# Patient Record
Sex: MALE | URBAN - METROPOLITAN AREA
[De-identification: names, ages, dates, MRNs, and addresses within clinical notes are randomized per-mention and may not be internally consistent; named-entity substitution may affect disease eponyms.]

---

## 2024-09-02 ENCOUNTER — HOSPITAL ENCOUNTER (OUTPATIENT)
Facility: MEDICAL CENTER | Age: 39
End: 2024-09-03
Attending: STUDENT IN AN ORGANIZED HEALTH CARE EDUCATION/TRAINING PROGRAM | Admitting: STUDENT IN AN ORGANIZED HEALTH CARE EDUCATION/TRAINING PROGRAM

## 2024-09-02 ENCOUNTER — HOSPITAL ENCOUNTER (OUTPATIENT)
Dept: RADIOLOGY | Facility: MEDICAL CENTER | Age: 39
End: 2024-09-02

## 2024-09-02 DIAGNOSIS — T50.901A ACCIDENTAL DRUG OVERDOSE, INITIAL ENCOUNTER: ICD-10-CM

## 2024-09-02 DIAGNOSIS — J96.01 ACUTE RESPIRATORY FAILURE WITH HYPOXIA (HCC): ICD-10-CM

## 2024-09-02 PROBLEM — F19.10 POLYSUBSTANCE ABUSE (HCC): Status: ACTIVE | Noted: 2024-09-02

## 2024-09-02 PROBLEM — R74.01 TRANSAMINITIS: Status: ACTIVE | Noted: 2024-09-02

## 2024-09-02 PROBLEM — R09.02 HYPOXEMIA: Status: ACTIVE | Noted: 2024-09-02

## 2024-09-02 LAB
ALBUMIN SERPL BCP-MCNC: 4.1 G/DL (ref 3.2–4.9)
ALBUMIN/GLOB SERPL: 1.4 G/DL
ALP SERPL-CCNC: 121 U/L (ref 30–99)
ALT SERPL-CCNC: 78 U/L (ref 2–50)
ANION GAP SERPL CALC-SCNC: 16 MMOL/L (ref 7–16)
AST SERPL-CCNC: 153 U/L (ref 12–45)
BASOPHILS # BLD AUTO: 0.3 % (ref 0–1.8)
BASOPHILS # BLD: 0.05 K/UL (ref 0–0.12)
BILIRUB SERPL-MCNC: 0.2 MG/DL (ref 0.1–1.5)
BUN SERPL-MCNC: 16 MG/DL (ref 8–22)
CALCIUM ALBUM COR SERPL-MCNC: 8.2 MG/DL (ref 8.5–10.5)
CALCIUM SERPL-MCNC: 8.3 MG/DL (ref 8.5–10.5)
CHLORIDE SERPL-SCNC: 103 MMOL/L (ref 96–112)
CO2 SERPL-SCNC: 21 MMOL/L (ref 20–33)
CREAT SERPL-MCNC: 0.83 MG/DL (ref 0.5–1.4)
EOSINOPHIL # BLD AUTO: 0.01 K/UL (ref 0–0.51)
EOSINOPHIL NFR BLD: 0.1 % (ref 0–6.9)
ERYTHROCYTE [DISTWIDTH] IN BLOOD BY AUTOMATED COUNT: 41.8 FL (ref 35.9–50)
GFR SERPLBLD CREATININE-BSD FMLA CKD-EPI: 115 ML/MIN/1.73 M 2
GLOBULIN SER CALC-MCNC: 2.9 G/DL (ref 1.9–3.5)
GLUCOSE SERPL-MCNC: 106 MG/DL (ref 65–99)
HCT VFR BLD AUTO: 49.5 % (ref 42–52)
HGB BLD-MCNC: 16.8 G/DL (ref 14–18)
IMM GRANULOCYTES # BLD AUTO: 0.12 K/UL (ref 0–0.11)
IMM GRANULOCYTES NFR BLD AUTO: 0.7 % (ref 0–0.9)
LYMPHOCYTES # BLD AUTO: 0.58 K/UL (ref 1–4.8)
LYMPHOCYTES NFR BLD: 3.4 % (ref 22–41)
MCH RBC QN AUTO: 30.3 PG (ref 27–33)
MCHC RBC AUTO-ENTMCNC: 33.9 G/DL (ref 32.3–36.5)
MCV RBC AUTO: 89.4 FL (ref 81.4–97.8)
MONOCYTES # BLD AUTO: 1.12 K/UL (ref 0–0.85)
MONOCYTES NFR BLD AUTO: 6.5 % (ref 0–13.4)
NEUTROPHILS # BLD AUTO: 15.41 K/UL (ref 1.82–7.42)
NEUTROPHILS NFR BLD: 89 % (ref 44–72)
NRBC # BLD AUTO: 0 K/UL
NRBC BLD-RTO: 0 /100 WBC (ref 0–0.2)
PLATELET # BLD AUTO: 276 K/UL (ref 164–446)
PMV BLD AUTO: 9 FL (ref 9–12.9)
POTASSIUM SERPL-SCNC: 4.2 MMOL/L (ref 3.6–5.5)
PROT SERPL-MCNC: 7 G/DL (ref 6–8.2)
RBC # BLD AUTO: 5.54 M/UL (ref 4.7–6.1)
SODIUM SERPL-SCNC: 140 MMOL/L (ref 135–145)
TROPONIN T SERPL-MCNC: 30 NG/L (ref 6–19)
TROPONIN T SERPL-MCNC: 34 NG/L (ref 6–19)
WBC # BLD AUTO: 17.3 K/UL (ref 4.8–10.8)

## 2024-09-02 PROCEDURE — 85025 COMPLETE CBC W/AUTO DIFF WBC: CPT

## 2024-09-02 PROCEDURE — 99418 PROLNG IP/OBS E/M EA 15 MIN: CPT | Performed by: STUDENT IN AN ORGANIZED HEALTH CARE EDUCATION/TRAINING PROGRAM

## 2024-09-02 PROCEDURE — 99223 1ST HOSP IP/OBS HIGH 75: CPT | Performed by: STUDENT IN AN ORGANIZED HEALTH CARE EDUCATION/TRAINING PROGRAM

## 2024-09-02 PROCEDURE — 700111 HCHG RX REV CODE 636 W/ 250 OVERRIDE (IP): Mod: JZ | Performed by: STUDENT IN AN ORGANIZED HEALTH CARE EDUCATION/TRAINING PROGRAM

## 2024-09-02 PROCEDURE — 99285 EMERGENCY DEPT VISIT HI MDM: CPT

## 2024-09-02 PROCEDURE — G0378 HOSPITAL OBSERVATION PER HR: HCPCS

## 2024-09-02 PROCEDURE — 96365 THER/PROPH/DIAG IV INF INIT: CPT

## 2024-09-02 PROCEDURE — 700105 HCHG RX REV CODE 258: Performed by: STUDENT IN AN ORGANIZED HEALTH CARE EDUCATION/TRAINING PROGRAM

## 2024-09-02 PROCEDURE — 36415 COLL VENOUS BLD VENIPUNCTURE: CPT

## 2024-09-02 PROCEDURE — 84484 ASSAY OF TROPONIN QUANT: CPT

## 2024-09-02 PROCEDURE — 80053 COMPREHEN METABOLIC PANEL: CPT

## 2024-09-02 RX ORDER — SODIUM CHLORIDE, SODIUM LACTATE, POTASSIUM CHLORIDE, CALCIUM CHLORIDE 600; 310; 30; 20 MG/100ML; MG/100ML; MG/100ML; MG/100ML
INJECTION, SOLUTION INTRAVENOUS CONTINUOUS
Status: DISCONTINUED | OUTPATIENT
Start: 2024-09-02 | End: 2024-09-03 | Stop reason: HOSPADM

## 2024-09-02 RX ORDER — ONDANSETRON 4 MG/1
4 TABLET, ORALLY DISINTEGRATING ORAL EVERY 4 HOURS PRN
Status: DISCONTINUED | OUTPATIENT
Start: 2024-09-02 | End: 2024-09-03 | Stop reason: HOSPADM

## 2024-09-02 RX ORDER — KETOROLAC TROMETHAMINE 15 MG/ML
15 INJECTION, SOLUTION INTRAMUSCULAR; INTRAVENOUS EVERY 6 HOURS PRN
Status: DISCONTINUED | OUTPATIENT
Start: 2024-09-02 | End: 2024-09-03 | Stop reason: HOSPADM

## 2024-09-02 RX ORDER — PROCHLORPERAZINE EDISYLATE 5 MG/ML
5-10 INJECTION INTRAMUSCULAR; INTRAVENOUS EVERY 4 HOURS PRN
Status: DISCONTINUED | OUTPATIENT
Start: 2024-09-02 | End: 2024-09-03 | Stop reason: HOSPADM

## 2024-09-02 RX ORDER — PROMETHAZINE HYDROCHLORIDE 25 MG/1
12.5-25 TABLET ORAL EVERY 4 HOURS PRN
Status: DISCONTINUED | OUTPATIENT
Start: 2024-09-02 | End: 2024-09-03 | Stop reason: HOSPADM

## 2024-09-02 RX ORDER — PROMETHAZINE HYDROCHLORIDE 25 MG/1
12.5-25 SUPPOSITORY RECTAL EVERY 4 HOURS PRN
Status: DISCONTINUED | OUTPATIENT
Start: 2024-09-02 | End: 2024-09-03 | Stop reason: HOSPADM

## 2024-09-02 RX ORDER — ENOXAPARIN SODIUM 100 MG/ML
40 INJECTION SUBCUTANEOUS DAILY
Status: DISCONTINUED | OUTPATIENT
Start: 2024-09-03 | End: 2024-09-03 | Stop reason: HOSPADM

## 2024-09-02 RX ORDER — ONDANSETRON 2 MG/ML
4 INJECTION INTRAMUSCULAR; INTRAVENOUS EVERY 4 HOURS PRN
Status: DISCONTINUED | OUTPATIENT
Start: 2024-09-02 | End: 2024-09-03 | Stop reason: HOSPADM

## 2024-09-02 RX ADMIN — SODIUM CHLORIDE, POTASSIUM CHLORIDE, SODIUM LACTATE AND CALCIUM CHLORIDE: 600; 310; 30; 20 INJECTION, SOLUTION INTRAVENOUS at 21:53

## 2024-09-02 RX ADMIN — AMPICILLIN AND SULBACTAM 3 G: 1; 2 INJECTION, POWDER, FOR SOLUTION INTRAMUSCULAR; INTRAVENOUS at 20:43

## 2024-09-02 SDOH — ECONOMIC STABILITY: TRANSPORTATION INSECURITY
IN THE PAST 12 MONTHS, HAS THE LACK OF TRANSPORTATION KEPT YOU FROM MEDICAL APPOINTMENTS OR FROM GETTING MEDICATIONS?: NO

## 2024-09-02 SDOH — ECONOMIC STABILITY: TRANSPORTATION INSECURITY
IN THE PAST 12 MONTHS, HAS LACK OF RELIABLE TRANSPORTATION KEPT YOU FROM MEDICAL APPOINTMENTS, MEETINGS, WORK OR FROM GETTING THINGS NEEDED FOR DAILY LIVING?: NO

## 2024-09-02 ASSESSMENT — LIFESTYLE VARIABLES
EVER HAD A DRINK FIRST THING IN THE MORNING TO STEADY YOUR NERVES TO GET RID OF A HANGOVER: NO
AVERAGE NUMBER OF DAYS PER WEEK YOU HAVE A DRINK CONTAINING ALCOHOL: 2
TOTAL SCORE: 1
TOTAL SCORE: 1
HOW MANY TIMES IN THE PAST YEAR HAVE YOU HAD 5 OR MORE DRINKS IN A DAY: 1
HAVE YOU EVER FELT YOU SHOULD CUT DOWN ON YOUR DRINKING: YES
DOES PATIENT WANT TO STOP DRINKING: NO
TOTAL SCORE: 1
CONSUMPTION TOTAL: POSITIVE
ALCOHOL_USE: YES
HAVE PEOPLE ANNOYED YOU BY CRITICIZING YOUR DRINKING: NO
ON A TYPICAL DAY WHEN YOU DRINK ALCOHOL HOW MANY DRINKS DO YOU HAVE: 2
EVER FELT BAD OR GUILTY ABOUT YOUR DRINKING: NO

## 2024-09-02 ASSESSMENT — SOCIAL DETERMINANTS OF HEALTH (SDOH)
WITHIN THE PAST 12 MONTHS, YOU WORRIED THAT YOUR FOOD WOULD RUN OUT BEFORE YOU GOT THE MONEY TO BUY MORE: NEVER TRUE
WITHIN THE LAST YEAR, HAVE YOU BEEN HUMILIATED OR EMOTIONALLY ABUSED IN OTHER WAYS BY YOUR PARTNER OR EX-PARTNER?: PATIENT DECLINED
WITHIN THE LAST YEAR, HAVE TO BEEN RAPED OR FORCED TO HAVE ANY KIND OF SEXUAL ACTIVITY BY YOUR PARTNER OR EX-PARTNER?: PATIENT DECLINED
WITHIN THE PAST 12 MONTHS, THE FOOD YOU BOUGHT JUST DIDN'T LAST AND YOU DIDN'T HAVE MONEY TO GET MORE: NEVER TRUE
IN THE PAST 12 MONTHS, HAS THE ELECTRIC, GAS, OIL, OR WATER COMPANY THREATENED TO SHUT OFF SERVICE IN YOUR HOME?: NO
WITHIN THE LAST YEAR, HAVE YOU BEEN AFRAID OF YOUR PARTNER OR EX-PARTNER?: PATIENT DECLINED
WITHIN THE LAST YEAR, HAVE YOU BEEN KICKED, HIT, SLAPPED, OR OTHERWISE PHYSICALLY HURT BY YOUR PARTNER OR EX-PARTNER?: PATIENT DECLINED

## 2024-09-02 ASSESSMENT — PATIENT HEALTH QUESTIONNAIRE - PHQ9
SUM OF ALL RESPONSES TO PHQ9 QUESTIONS 1 AND 2: 0
1. LITTLE INTEREST OR PLEASURE IN DOING THINGS: NOT AT ALL
2. FEELING DOWN, DEPRESSED, IRRITABLE, OR HOPELESS: NOT AT ALL

## 2024-09-02 ASSESSMENT — FIBROSIS 4 INDEX: FIB4 SCORE: 2.32

## 2024-09-02 ASSESSMENT — PAIN DESCRIPTION - PAIN TYPE: TYPE: ACUTE PAIN

## 2024-09-03 ENCOUNTER — APPOINTMENT (OUTPATIENT)
Dept: RADIOLOGY | Facility: MEDICAL CENTER | Age: 39
End: 2024-09-03
Attending: STUDENT IN AN ORGANIZED HEALTH CARE EDUCATION/TRAINING PROGRAM

## 2024-09-03 ENCOUNTER — PHARMACY VISIT (OUTPATIENT)
Dept: PHARMACY | Facility: MEDICAL CENTER | Age: 39
End: 2024-09-03
Payer: COMMERCIAL

## 2024-09-03 VITALS
HEART RATE: 76 BPM | TEMPERATURE: 98 F | RESPIRATION RATE: 18 BRPM | BODY MASS INDEX: 28.64 KG/M2 | OXYGEN SATURATION: 91 % | HEIGHT: 69 IN | DIASTOLIC BLOOD PRESSURE: 55 MMHG | WEIGHT: 193.34 LBS | SYSTOLIC BLOOD PRESSURE: 99 MMHG

## 2024-09-03 LAB
ALBUMIN SERPL BCP-MCNC: 3.7 G/DL (ref 3.2–4.9)
ALBUMIN/GLOB SERPL: 1.4 G/DL
ALP SERPL-CCNC: 101 U/L (ref 30–99)
ALT SERPL-CCNC: 62 U/L (ref 2–50)
ANION GAP SERPL CALC-SCNC: 11 MMOL/L (ref 7–16)
AST SERPL-CCNC: 66 U/L (ref 12–45)
BASOPHILS # BLD AUTO: 0.3 % (ref 0–1.8)
BASOPHILS # BLD: 0.02 K/UL (ref 0–0.12)
BILIRUB SERPL-MCNC: 0.4 MG/DL (ref 0.1–1.5)
BUN SERPL-MCNC: 15 MG/DL (ref 8–22)
CALCIUM ALBUM COR SERPL-MCNC: 8.9 MG/DL (ref 8.5–10.5)
CALCIUM SERPL-MCNC: 8.7 MG/DL (ref 8.5–10.5)
CHLORIDE SERPL-SCNC: 103 MMOL/L (ref 96–112)
CO2 SERPL-SCNC: 25 MMOL/L (ref 20–33)
CREAT SERPL-MCNC: 0.72 MG/DL (ref 0.5–1.4)
EOSINOPHIL # BLD AUTO: 0.01 K/UL (ref 0–0.51)
EOSINOPHIL NFR BLD: 0.1 % (ref 0–6.9)
ERYTHROCYTE [DISTWIDTH] IN BLOOD BY AUTOMATED COUNT: 42.5 FL (ref 35.9–50)
GFR SERPLBLD CREATININE-BSD FMLA CKD-EPI: 120 ML/MIN/1.73 M 2
GLOBULIN SER CALC-MCNC: 2.7 G/DL (ref 1.9–3.5)
GLUCOSE SERPL-MCNC: 117 MG/DL (ref 65–99)
HCT VFR BLD AUTO: 45.9 % (ref 42–52)
HGB BLD-MCNC: 15.3 G/DL (ref 14–18)
IMM GRANULOCYTES # BLD AUTO: 0.03 K/UL (ref 0–0.11)
IMM GRANULOCYTES NFR BLD AUTO: 0.4 % (ref 0–0.9)
LYMPHOCYTES # BLD AUTO: 1.26 K/UL (ref 1–4.8)
LYMPHOCYTES NFR BLD: 16.3 % (ref 22–41)
MCH RBC QN AUTO: 29.8 PG (ref 27–33)
MCHC RBC AUTO-ENTMCNC: 33.3 G/DL (ref 32.3–36.5)
MCV RBC AUTO: 89.5 FL (ref 81.4–97.8)
MONOCYTES # BLD AUTO: 0.53 K/UL (ref 0–0.85)
MONOCYTES NFR BLD AUTO: 6.8 % (ref 0–13.4)
NEUTROPHILS # BLD AUTO: 5.9 K/UL (ref 1.82–7.42)
NEUTROPHILS NFR BLD: 76.1 % (ref 44–72)
NRBC # BLD AUTO: 0 K/UL
NRBC BLD-RTO: 0 /100 WBC (ref 0–0.2)
PLATELET # BLD AUTO: 238 K/UL (ref 164–446)
PMV BLD AUTO: 9.1 FL (ref 9–12.9)
POTASSIUM SERPL-SCNC: 4 MMOL/L (ref 3.6–5.5)
PROT SERPL-MCNC: 6.4 G/DL (ref 6–8.2)
RBC # BLD AUTO: 5.13 M/UL (ref 4.7–6.1)
SODIUM SERPL-SCNC: 139 MMOL/L (ref 135–145)
TROPONIN T SERPL-MCNC: 30 NG/L (ref 6–19)
WBC # BLD AUTO: 7.8 K/UL (ref 4.8–10.8)

## 2024-09-03 PROCEDURE — 99239 HOSP IP/OBS DSCHRG MGMT >30: CPT | Performed by: HOSPITALIST

## 2024-09-03 PROCEDURE — G0378 HOSPITAL OBSERVATION PER HR: HCPCS

## 2024-09-03 PROCEDURE — 80053 COMPREHEN METABOLIC PANEL: CPT

## 2024-09-03 PROCEDURE — 76705 ECHO EXAM OF ABDOMEN: CPT

## 2024-09-03 PROCEDURE — RXMED WILLOW AMBULATORY MEDICATION CHARGE: Performed by: HOSPITALIST

## 2024-09-03 PROCEDURE — 700111 HCHG RX REV CODE 636 W/ 250 OVERRIDE (IP): Mod: JZ | Performed by: STUDENT IN AN ORGANIZED HEALTH CARE EDUCATION/TRAINING PROGRAM

## 2024-09-03 PROCEDURE — 85025 COMPLETE CBC W/AUTO DIFF WBC: CPT

## 2024-09-03 PROCEDURE — 84484 ASSAY OF TROPONIN QUANT: CPT

## 2024-09-03 PROCEDURE — 700105 HCHG RX REV CODE 258: Performed by: STUDENT IN AN ORGANIZED HEALTH CARE EDUCATION/TRAINING PROGRAM

## 2024-09-03 RX ADMIN — AMPICILLIN AND SULBACTAM 3 G: 1; 2 INJECTION, POWDER, FOR SOLUTION INTRAMUSCULAR; INTRAVENOUS at 05:41

## 2024-09-03 RX ADMIN — AMPICILLIN AND SULBACTAM 3 G: 1; 2 INJECTION, POWDER, FOR SOLUTION INTRAMUSCULAR; INTRAVENOUS at 01:08

## 2024-09-03 NOTE — ED TRIAGE NOTES
"Chief Complaint   Patient presents with    Drug Overdose     Pt transferred from [facility] for possible drug overdose pt was found unresponsive and cyanotic at camp site given 12 narcan after 4 rounds of CPR via AED pt became responsive.        [facility] via [place] for above complaint Pt was found unresponsive in tent by friends BLS unit called and performed 4 rounds of CPR with AED but no shockable rhythm, no ACLS drugs administered. pt was given 12 doses of Narcan and became responsive PTA to hospital. Only interventions done in ED was zofran given for one episode of vomiting. Pt is Citizen of [place] speaking only.      Labs drawn, Cardiac, spO2 and BP monitor applied.       /72   Pulse 79   Temp 36.5 °C (97.7 °F) (Temporal)   Resp 18   Ht 1.75 m (5' 8.9\")   Wt 83.9 kg (185 lb)   SpO2 91%   BMI 27.40 kg/m²     "

## 2024-09-03 NOTE — PROGRESS NOTES
Bedside shift report received from night shift RN. Patient A&Ox4, in bed resting comfortably. Slov[place] speaking only. Bed in lowest, locked position with call light and belongings within reach. Fall precautions reviewed with patient. Patient updated on POC.

## 2024-09-03 NOTE — H&P
Hospital Medicine History & Physical Note    Date of Service  9/2/2024    Primary Care Physician  No primary care provider on file.    Consultants  None    Code Status  Full Code    Chief Complaint  Chief Complaint   Patient presents with    Drug Overdose     Pt transferred from [facility] for possible drug overdose pt was found unresponsive and cyanotic at [place] site given 12 narcan after 4 rounds of CPR via AED pt became responsive.        History of Presenting Illness  Josh Cramer is a 39 y.o. male who presented 9/2/2024 with altered mental status.  Patient is a poor historian.  He was found unresponsive in his car.  EMS was called, patient was given 4 rounds of CPR and 12 Narcan with some improvement in mental status  and was then taken to the ER for further evaluation.    At outside hospital:  Troponin 87 (N: <75)  Glucose 167 BUN 16 creatinine 1.39 bicarbonate 20 chloride 102 sodium 141 potassium 4.5 anion gap 23 alkaline phosphatase 144    EKG shows normal sinus rhythm with no ischemic changes  Hemoglobin 17.2  Tylenol level 0 salicylate level 7.5 ethanol level 14 (N: <10)  U tox positive for cocaine  CT head showing no acute pathology    Patient was then transferred to [facility] for further care.  Upon my assessment with him, patient has no recollection of what happened today.  He denies any illicit drug use and states that he is a social drinker but denies any recent alcohol use in the last few days.  Patient admitted for hypoxemia.      I discussed the plan of care with patient.    Review of Systems  ROS    Past Medical History   has no past medical history on file.    Surgical History   has no past surgical history on file.     Family History  family history is not on file.   Family history reviewed with patient. There is no family history that is pertinent to the chief complaint.     Social History       Allergies  No Known Allergies    Medications  None       Physical Exam  Temp:  [36.5 °C  "(97.7 °F)] 36.5 °C (97.7 °F)  Pulse:  [78-79] 78  Resp:  [18-20] 20  BP: (115)/(72) 115/72  SpO2:  [91 %-96 %] 96 %  Blood Pressure: 115/72   Temperature: 36.5 °C (97.7 °F)   Pulse: 78   Respiration: 20   Pulse Oximetry: 96 %       Physical Exam  Constitutional:       Appearance: Normal appearance.      Comments: Lethargic, poor historian   HENT:      Head: Normocephalic.      Nose: Nose normal.      Mouth/Throat:      Mouth: Mucous membranes are moist.   Eyes:      Pupils: Pupils are equal, round, and reactive to light.   Cardiovascular:      Rate and Rhythm: Normal rate and regular rhythm.      Pulses: Normal pulses.   Pulmonary:      Effort: Pulmonary effort is normal.      Breath sounds: Normal breath sounds.   Abdominal:      General: Abdomen is flat. Bowel sounds are normal.      Palpations: Abdomen is soft.   Musculoskeletal:         General: Normal range of motion.      Cervical back: Neck supple.   Skin:     General: Skin is warm.   Neurological:      General: No focal deficit present.      Mental Status: He is oriented to person, place, and time. Mental status is at baseline.   Psychiatric:         Mood and Affect: Mood normal.         Behavior: Behavior normal.         Thought Content: Thought content normal.         Judgment: Judgment normal.         Laboratory:  Recent Labs     09/02/24 1847   WBC 17.3*   RBC 5.54   HEMOGLOBIN 16.8   HEMATOCRIT 49.5   MCV 89.4   MCH 30.3   MCHC 33.9   RDW 41.8   PLATELETCT 276   MPV 9.0     Recent Labs     09/02/24  1847   SODIUM 140   POTASSIUM 4.2   CHLORIDE 103   CO2 21   GLUCOSE 106*   BUN 16   CREATININE 0.83   CALCIUM 8.3*     Recent Labs     09/02/24  1847   ALTSGPT 78*   ASTSGOT 153*   ALKPHOSPHAT 121*   TBILIRUBIN 0.2   GLUCOSE 106*         No results for input(s): \"NTPROBNP\" in the last 72 hours.      Recent Labs     09/02/24  1847   TROPONINT 30*       Imaging:  OUTSIDE IMAGES-DX CHEST   Final Result          X-Ray:  I have personally reviewed the images and " compared with prior images.    Assessment/Plan:  Justification for Admission Status  I anticipate this patient is appropriate for observation status at this time because patient has suspected aspiration pneumonia    Patient will need a Med/Surg bed on MEDICAL service .  The need is secondary to hypoxemia.    * Hypoxemia- (present on admission)  Assessment & Plan  Unclear cause of hypoxemia at the moment, aspiration pneumonia possibility  Can start Unasyn for now as patient also has leukocytosis  U-Tox positive for cocaine, patient has been given multiple rounds of Narcan    Polysubstance abuse (HCC)  Assessment & Plan  U tox positive for cocaine  Avoid beta-blockers    Transaminitis  Assessment & Plan  Patient reports only being a social drinker and denies any recent alcohol use  Found to have transaminitis with mildly elevated ethanol level  Transaminitis pattern suggestive of alcoholic liver disease  Right precordial ultrasound        VTE prophylaxis: enoxaparin ppx    Total time spent on visit 90 minutes reviewing records from outside facility, discussing treatment plan, diagnosis, risk and benefit, prognosis

## 2024-09-03 NOTE — PROGRESS NOTES
Received pt on 3L per nc from noc shift. Titrated down to RA, see flow sheets for details. Pt 86% on RA, did not rise with deep breathing and IS use. Placed back on 1L per nc with O2 >90% at this time.

## 2024-09-03 NOTE — ED PROVIDER NOTES
"ED Provider Note    CHIEF COMPLAINT  Chief Complaint   Patient presents with    Drug Overdose     Pt transferred from [facility] for possible drug overdose pt was found unresponsive and cyanotic at camp site given 12 narcan after 4 rounds of CPR via AED pt became responsive.        EXTERNAL RECORDS REVIEWED  External ED Note From outside hospital today, negtive CT head, CXR, blood work with elevated troponin.    HPI/ROS  LIMITATION TO HISTORY   Select: Language [place],  Used   OUTSIDE HISTORIAN(S):      Josh Carmer is a 39 y.o. male who presents after cardiac arrest. Patient was found unresponsive by friends. First responders found patient in cardiac arrest and performed 4 rounds of CPR, no AED shock. Narcan given shortly before paramedic arrival. Patient became alert but agitated shortly after. Patient has no specific complaints.    PAST MEDICAL HISTORY       SURGICAL HISTORY  patient denies any surgical history    FAMILY HISTORY  No family history on file.    SOCIAL HISTORY  Social History     Tobacco Use    Smoking status: Never    Smokeless tobacco: Never   Vaping Use    Vaping status: Never Used   Substance and Sexual Activity    Alcohol use: Not on file    Drug use: Not on file    Sexual activity: Not on file       CURRENT MEDICATIONS  Home Medications       Reviewed by Nadia Blake R.N. (Registered Nurse) on 09/02/24 at 2317  Med List Status: Complete     Medication Last Dose Status        Patient Shan Taking any Medications                           ALLERGIES  No Known Allergies    PHYSICAL EXAM  VITAL SIGNS: /59   Pulse 75   Temp 36.5 °C (97.7 °F) (Temporal)   Resp 18   Ht 1.75 m (5' 8.9\")   Wt 87.7 kg (193 lb 5.5 oz)   SpO2 95%   BMI 28.64 kg/m²    Physical Exam  Vitals and nursing note reviewed. Exam conducted with a chaperone present.   HENT:      Head: Normocephalic and atraumatic.      Right Ear: External ear normal.      Left Ear: External ear normal.      " Mouth/Throat:      Mouth: Mucous membranes are moist.   Eyes:      Extraocular Movements: Extraocular movements intact.      Pupils: Pupils are equal, round, and reactive to light.   Cardiovascular:      Rate and Rhythm: Normal rate and regular rhythm.      Pulses: Normal pulses.   Pulmonary:      Effort: Pulmonary effort is normal.      Breath sounds: Normal breath sounds.   Abdominal:      General: Abdomen is flat.      Palpations: Abdomen is soft.   Musculoskeletal:         General: Normal range of motion.      Cervical back: Normal range of motion and neck supple.   Skin:     General: Skin is warm.      Capillary Refill: Capillary refill takes less than 2 seconds.   Neurological:      General: No focal deficit present.      Mental Status: He is alert. He is disoriented.          EKG/LABS  Results for orders placed or performed during the hospital encounter of 09/02/24   CBC WITH DIFFERENTIAL   Result Value Ref Range    WBC 17.3 (H) 4.8 - 10.8 K/uL    RBC 5.54 4.70 - 6.10 M/uL    Hemoglobin 16.8 14.0 - 18.0 g/dL    Hematocrit 49.5 42.0 - 52.0 %    MCV 89.4 81.4 - 97.8 fL    MCH 30.3 27.0 - 33.0 pg    MCHC 33.9 32.3 - 36.5 g/dL    RDW 41.8 35.9 - 50.0 fL    Platelet Count 276 164 - 446 K/uL    MPV 9.0 9.0 - 12.9 fL    Neutrophils-Polys 89.00 (H) 44.00 - 72.00 %    Lymphocytes 3.40 (L) 22.00 - 41.00 %    Monocytes 6.50 0.00 - 13.40 %    Eosinophils 0.10 0.00 - 6.90 %    Basophils 0.30 0.00 - 1.80 %    Immature Granulocytes 0.70 0.00 - 0.90 %    Nucleated RBC 0.00 0.00 - 0.20 /100 WBC    Neutrophils (Absolute) 15.41 (H) 1.82 - 7.42 K/uL    Lymphs (Absolute) 0.58 (L) 1.00 - 4.80 K/uL    Monos (Absolute) 1.12 (H) 0.00 - 0.85 K/uL    Eos (Absolute) 0.01 0.00 - 0.51 K/uL    Baso (Absolute) 0.05 0.00 - 0.12 K/uL    Immature Granulocytes (abs) 0.12 (H) 0.00 - 0.11 K/uL    NRBC (Absolute) 0.00 K/uL   COMP METABOLIC PANEL   Result Value Ref Range    Sodium 140 135 - 145 mmol/L    Potassium 4.2 3.6 - 5.5 mmol/L    Chloride  103 96 - 112 mmol/L    Co2 21 20 - 33 mmol/L    Anion Gap 16.0 7.0 - 16.0    Glucose 106 (H) 65 - 99 mg/dL    Bun 16 8 - 22 mg/dL    Creatinine 0.83 0.50 - 1.40 mg/dL    Calcium 8.3 (L) 8.5 - 10.5 mg/dL    Correct Calcium 8.2 (L) 8.5 - 10.5 mg/dL    AST(SGOT) 153 (H) 12 - 45 U/L    ALT(SGPT) 78 (H) 2 - 50 U/L    Alkaline Phosphatase 121 (H) 30 - 99 U/L    Total Bilirubin 0.2 0.1 - 1.5 mg/dL    Albumin 4.1 3.2 - 4.9 g/dL    Total Protein 7.0 6.0 - 8.2 g/dL    Globulin 2.9 1.9 - 3.5 g/dL    A-G Ratio 1.4 g/dL   TROPONIN   Result Value Ref Range    Troponin T 30 (H) 6 - 19 ng/L   TROPONIN   Result Value Ref Range    Troponin T 34 (H) 6 - 19 ng/L   ESTIMATED GFR   Result Value Ref Range    GFR (CKD-EPI) 115 >60 mL/min/1.73 m 2         RADIOLOGY/PROCEDURES   I have independently interpreted the diagnostic imaging associated with this visit and am waiting the final reading from the radiologist.        Radiologist interpretation:  OUTSIDE IMAGES-DX CHEST   Final Result      US-RUQ    (Results Pending)       COURSE & MEDICAL DECISION MAKING    ASSESSMENT, COURSE AND PLAN  Care Narrative: Patient with reported cardiac arrest, rapid improvement with naloxone suggests primary respiratory arrest due to opioid. CT head and CXR at outside hospital reviewed and normal. Troponin elevated at outside hospital. Will repeat blood work and admit for ongoing confusion. Spoke with hospitalist regarding admission.              ADDITIONAL PROBLEMS MANAGED      DISPOSITION AND DISCUSSIONS  I have discussed management of the patient with the following physicians and ELIF's:  Hospitalist      FINAL DIAGNOSIS  1. Accidental drug overdose, initial encounter    2. Acute respiratory failure with hypoxia (HCC)         Electronically signed by: Nahid Clement D.O., 9/2/2024 7:07 PM

## 2024-09-03 NOTE — ASSESSMENT & PLAN NOTE
Unclear cause of hypoxemia at the moment, aspiration pneumonia possibility  Can start Unasyn for now as patient also has leukocytosis  U-Tox positive for cocaine, patient has been given multiple rounds of Narcan

## 2024-09-03 NOTE — DISCHARGE PLANNING
Discussed in AM rounds, Possible need for ride @ D/C. Patient does have emergency contact listed, will try for emergency contact for ride @ D/C .

## 2024-09-03 NOTE — PROGRESS NOTES
Patient on CDU from the ED. Walked to the restroom and is in bed with the bed locked and in the lowest position. Call light is within reach.

## 2024-09-03 NOTE — PROGRESS NOTES
PIV removed with tip intact and site covered with gauze and coban. Armband removed. Discussed dc instructions (using a ) including medications, and follow-up appointments. Meds to beds delivered.

## 2024-09-03 NOTE — PROGRESS NOTES
4 Eyes Skin Assessment Completed by PURNIMA Zuniga and PURNIMA Thomas.    Head WDL  Ears WDL  Nose WDL  Mouth WDL  Neck WDL  Breast/Chest WDL  Shoulder Blades WDL  Spine WDL  (R) Arm/Elbow/Hand WDL  (L) Arm/Elbow/Hand WDL  Abdomen WDL  Groin WDL  Scrotum/Coccyx/Buttocks WDL  (R) Leg WDL  (L) Leg WDL  (R) Heel/Foot/Toe WDL  (L) Heel/Foot/Toe WDL          Devices In Places Blood Pressure Cuff and Pulse Ox, nasal cannula      Interventions In Place Gray Ear Foams    Possible Skin Injury No    Pictures Uploaded Into Epic N/A  Wound Consult Placed N/A  RN Wound Prevention Protocol Ordered No

## 2024-09-03 NOTE — ASSESSMENT & PLAN NOTE
Patient reports only being a social drinker and denies any recent alcohol use  Found to have transaminitis with mildly elevated ethanol level  Transaminitis pattern suggestive of alcoholic liver disease  Right precordial ultrasound

## 2024-09-03 NOTE — DISCHARGE INSTRUCTIONS
Aspiration Pneumonia, Adult    Aspiration pneumonia is an infection that occurs after lung (pulmonary) aspiration. Pulmonary aspiration is when you inhale a large amount of food, liquid, stomach acid, or saliva into the lungs. This can cause inflammation and infection in the lungs. This can make you cough and make it hard to breathe. Aspiration pneumonia is a serious condition and can be life-threatening.  What are the causes?  This condition may be caused by:  Bacteria in food, liquid, stomach acid, or saliva that is inhaled into the lung.  Irritation and inflammation that results from material, such as blood or a foreign body, being inhaled into the lung. This can lead to an infection even though the material is not originally contaminated with bacteria.  What increases the risk?  You are more likely to get aspiration pneumonia if you have a condition that makes it hard to breathe, swallow, cough, or gag. These conditions may include:  A breathing disorder, such as chronic obstructive pulmonary disease, that makes it hard to eat or drink while breathing.  A brain (neurologic) disorder, such as stroke, seizures, Parkinson's disease, dementia, amyotrophic lateral sclerosis (ALS), or brain injury.  Having gastroesophageal reflux disease (GERD).  Having a weak disease-fighting system (immune system).  Having a narrowing of the tube that carries food to the stomach (esophageal narrowing).  Other factors that may make you more likely to get aspiration pneumonia include:  Being older than age 60 and frail.  Being given a general anesthetic for procedures.  Drinking too much alcohol and passing out. If you pass out and vomit, then vomit can be inhaled into your lungs.  Taking certain medicines, such as tranquilizers or sedatives.  Taking poor care of your mouth and teeth.  Being malnourished.  What are the signs or symptoms?  The main symptom of pulmonary aspiration may be an episode of choking or coughing while eating or  drinking.  When aspiration pneumonia develops, symptoms include:  Persistent cough.  Difficulty breathing, such as wheezing or shortness of breath.  Fever.  Chest pain.  Being more tired than usual (fatigue).  Pulmonary aspiration may be silent, meaning that it is not associated with coughing or choking while eating or drinking.  How is this diagnosed?  This condition may be diagnosed based on:  A physical exam.  Tests, such as:  A chest X-ray.  A sputum culture. Saliva and mucus (sputum) are collected from the lungs or the tubes that carry air to the lungs (bronchi). The sputum is then tested for bacteria.  Oximetry. A sensor or clip is placed on areas such as a finger, earlobe, or toe to measure the oxygen level in your blood.  Blood tests.  A swallowing study. This test looks at how food is swallowed and whether it goes into your windpipe (trachea) or esophagus.  A bronchoscopy. This test uses a flexible tube (bronchoscope) to see inside the lungs.  How is this treated?  This condition may be treated with:  Medicines. Antibiotic medicine will be given to kill the pneumonia bacteria. Other medicines may also be used to reduce fever, pain, or inflammation.  Breathing assistance and oxygen therapy. Depending on how well you are breathing, you may need to be given oxygen, or you may need breathing support from a breathing machine (ventilator).  Thoracentesis. This is a procedure to remove fluid that has built up in the space between the linings of the chest wall and the lungs.  Dietary changes. You may need to avoid certain food textures or liquids.  For people who have recurrent aspiration pneumonia, a feeding tube might be placed in the stomach for nutrition.  Follow these instructions at home:  Medicines    Take over-the-counter and prescription medicines only as told by your health care provider.  If you were prescribed an antibiotic medicine, take it as told by your health care provider. Do not stop taking the  antibiotic even if you start to feel better.  Take cough medicine only if you are losing sleep. Cough medicine can prevent your body's natural ability to remove mucus from your lungs.  General instructions    Carefully follow any eating instructions you were given, such as avoiding certain food textures or thickening your liquids. Thickening liquids reduces the risk of developing aspiration pneumonia again.  Return to normal activities as told by your health care provider. Ask your health care provider what activities are safe for you.  Sleep in a semi-upright position at night. Try to sleep in a reclining chair, or place a few pillows under your head in bed.  Do not use any products that contain nicotine or tobacco, such as cigarettes, e-cigarettes, and chewing tobacco. If you need help quitting, ask your health care provider.  Keep all follow-up visits as told by your health care provider. This is important.  Contact a health care provider if you:  Have a fever.  Are coughing or choking while eating or drinking.  Continue to have signs or symptoms of aspiration pneumonia.  Get help right away if you have:  Worsening shortness of breath, wheezing, or difficulty breathing.  Chest pain.  Summary  Aspiration pneumonia is an infection that occurs after lung (pulmonary) aspiration. Pulmonary aspiration is when you inhale a large amount of food, liquid, stomach acid, or saliva into the lungs.  The main symptom of pulmonary aspiration may be an episode of choking or coughing. It may also be silent without coughing or choking.  You are more likely to get aspiration pneumonia if you have a condition that makes it hard to breathe, swallow, cough, or gag.  This information is not intended to replace advice given to you by your health care provider. Make sure you discuss any questions you have with your health care provider.  Document Revised: 12/18/2020 Document Reviewed: 12/18/2020  Elsevier Patient Education © 2023 Elsevier  Inc.      Substance Abuse  Your exam indicates that you have a problem with substance abuse. Substance abuse is the misuse of alcohol or drugs that causes problems in family life, friendships, and work relationships. Substance abuse is the most important cause of premature illness, disability, and death in our society. It is also the greatest threat to a person's mental and spiritual well being.  Substance abuse can start out in an innocent way, such as social drinking or taking a little extra medication prescribed by your doctor. No one starts out with the intention of becoming an alcoholic or an addict. Substance abuse victims cannot control their use of alcohol or drugs. They may become intoxicated daily or go on weekend binges. Often there is a strong desire to quit, but attempts to stop using often fail. Encounters with law enforcement or conflicts with family members, friends, and work associates are signs of a potential problem.  Recovery is always possible, although the craving for some drugs makes it difficult to quit without assistance. Many treatment programs are available to help people stop abusing alcohol or drugs. The first step in treatment is to admit you have a problem. This is a major danita because denial is a powerful force with substance abuse.  Alcoholics Anonymous, Narcotics Anonymous, Cocaine Anonymous, and other recovery groups and programs can be very useful in helping people to quit. If you do not feel okay about your drug or alcohol use and if it is causing you trouble, we want to encourage you to talk about it with your doctor or with someone from a recovery group who can help you. You could also call the [employer] [place] on Drug Abuse at 7-574-319-HELP. It is up to you to take the first step.  AL-ARYAN and RAUL-TEEN are support groups for friends and family members of an alcohol or drug dependent person. The people who love and care for the alcoholic or addicted person often need help,  too. For information about these organizations, check your phone directory or call a local alcohol or drug treatment center.  Document Released: 01/25/2006 Document Revised: 03/11/2013 Document Reviewed: 12/19/2009  [employer]® Patient Information ©2014 [employer], [employer].

## 2024-09-04 NOTE — DISCHARGE SUMMARY
Discharge Summary    CHIEF COMPLAINT ON ADMISSION  Chief Complaint   Patient presents with    Drug Overdose     Pt transferred from [facility] for possible drug overdose pt was found unresponsive and cyanotic [place] given 12 narcan after 4 rounds of CPR via AED pt became responsive.        Reason for Admission  [facility]     Admission Date  9/2/2024    CODE STATUS  Prior    HPI & HOSPITAL COURSE  As per dr rivera h+p    Josh Cramer is a 39 y.o. male who presented 9/2/2024 with altered mental status.  Patient is a poor historian.  He was found unresponsive in his car.  EMS was called, patient was given 4 rounds of CPR and 12 Narcan with some improvement in mental status  and was then taken to the ER for further evaluation.     At outside hospital:  Troponin 87 (N: <75)  Glucose 167 BUN 16 creatinine 1.39 bicarbonate 20 chloride 102 sodium 141 potassium 4.5 anion gap 23 alkaline phosphatase 144    EKG shows normal sinus rhythm with no ischemic changes  Hemoglobin 17.2  Tylenol level 0 salicylate level 7.5 ethanol level 14 (N: <10)  U tox positive for cocaine  CT head showing no acute pathology     Patient was then transferred to [facility] for further care.  Upon my assessment with him, patient has no recollection of what happened today.  He denies any illicit drug use and states that he is a social drinker but denies any recent alcohol use in the last few days.  Patient admitted for hypoxemia.    ===============================      The patient was monitored in the hospital given supportive care    He was alert and awake and oriented.  His hypoxia improved as patient mental status improved.  Patient did not require any home oxygen.  Patient will advised to stay away from illicit drugs.  Patient was cleared for discharge home on 9/3/2024    Therefore, he is discharged in good and stable condition to home with close outpatient follow-up.    The patient recovered much more quickly than anticipated  on admission.    Discharge Date  9/3/2024    FOLLOW UP ITEMS POST DISCHARGE      DISCHARGE DIAGNOSES  Principal Problem:    Hypoxemia (POA: Yes)  Active Problems:    Transaminitis (POA: Yes)    Polysubstance abuse (HCC) (POA: Yes)  Resolved Problems:    * No resolved hospital problems. *      FOLLOW UP  No future appointments.  No follow-up provider specified.    MEDICATIONS ON DISCHARGE     Medication List        START taking these medications        Instructions   amoxicillin-clavulanate 875-125 MG Tabs  Commonly known as: Augmentin   Take 1 Tablet by mouth 2 times a day for 4 days.  Dose: 1 Tablet              Allergies  No Known Allergies    DIET  No orders of the defined types were placed in this encounter.      ACTIVITY  As tolerated.  Weight bearing as tolerated    CONSULTATIONS      PROCEDURES      LABORATORY  Lab Results   Component Value Date    SODIUM 139 09/03/2024    POTASSIUM 4.0 09/03/2024    CHLORIDE 103 09/03/2024    CO2 25 09/03/2024    GLUCOSE 117 (H) 09/03/2024    BUN 15 09/03/2024    CREATININE 0.72 09/03/2024        Lab Results   Component Value Date    WBC 7.8 09/03/2024    HEMOGLOBIN 15.3 09/03/2024    HEMATOCRIT 45.9 09/03/2024    PLATELETCT 238 09/03/2024        Total time of the discharge process exceeds 36  minutes.